# Patient Record
Sex: FEMALE | HISPANIC OR LATINO | Employment: UNEMPLOYED | URBAN - METROPOLITAN AREA
[De-identification: names, ages, dates, MRNs, and addresses within clinical notes are randomized per-mention and may not be internally consistent; named-entity substitution may affect disease eponyms.]

---

## 2023-11-23 ENCOUNTER — APPOINTMENT (OUTPATIENT)
Dept: GENERAL RADIOLOGY | Facility: CLINIC | Age: 33
End: 2023-11-23
Attending: EMERGENCY MEDICINE

## 2023-11-23 ENCOUNTER — APPOINTMENT (OUTPATIENT)
Dept: ULTRASOUND IMAGING | Facility: CLINIC | Age: 33
End: 2023-11-23
Attending: EMERGENCY MEDICINE

## 2023-11-23 ENCOUNTER — HOSPITAL ENCOUNTER (EMERGENCY)
Facility: CLINIC | Age: 33
Discharge: HOME | End: 2023-11-23
Attending: EMERGENCY MEDICINE | Admitting: EMERGENCY MEDICINE

## 2023-11-23 VITALS
OXYGEN SATURATION: 100 % | SYSTOLIC BLOOD PRESSURE: 121 MMHG | HEART RATE: 58 BPM | DIASTOLIC BLOOD PRESSURE: 81 MMHG | RESPIRATION RATE: 18 BRPM | TEMPERATURE: 97.9 F

## 2023-11-23 DIAGNOSIS — M79.7 FIBROMYALGIA: ICD-10-CM

## 2023-11-23 DIAGNOSIS — R52 BODY ACHES: ICD-10-CM

## 2023-11-23 LAB
ALBUMIN UR-MCNC: 20 MG/DL
ANION GAP SERPL CALCULATED.3IONS-SCNC: 9 MMOL/L (ref 7–15)
APPEARANCE UR: CLEAR
BASOPHILS # BLD AUTO: 0.1 10E3/UL (ref 0–0.2)
BASOPHILS NFR BLD AUTO: 1 %
BILIRUB UR QL STRIP: NEGATIVE
BUN SERPL-MCNC: 11.9 MG/DL (ref 6–20)
CALCIUM SERPL-MCNC: 9.1 MG/DL (ref 8.6–10)
CHLORIDE SERPL-SCNC: 101 MMOL/L (ref 98–107)
COLOR UR AUTO: YELLOW
CREAT SERPL-MCNC: 0.52 MG/DL (ref 0.51–0.95)
DEPRECATED HCO3 PLAS-SCNC: 27 MMOL/L (ref 22–29)
EGFRCR SERPLBLD CKD-EPI 2021: >90 ML/MIN/1.73M2
EOSINOPHIL # BLD AUTO: 0.1 10E3/UL (ref 0–0.7)
EOSINOPHIL NFR BLD AUTO: 2 %
ERYTHROCYTE [DISTWIDTH] IN BLOOD BY AUTOMATED COUNT: 14.1 % (ref 10–15)
GLUCOSE SERPL-MCNC: 89 MG/DL (ref 70–99)
GLUCOSE UR STRIP-MCNC: NEGATIVE MG/DL
HCG UR QL: NEGATIVE
HCT VFR BLD AUTO: 40.4 % (ref 35–47)
HGB BLD-MCNC: 13 G/DL (ref 11.7–15.7)
HGB UR QL STRIP: NEGATIVE
HOLD SPECIMEN: NORMAL
HOLD SPECIMEN: NORMAL
IMM GRANULOCYTES # BLD: 0 10E3/UL
IMM GRANULOCYTES NFR BLD: 1 %
KETONES UR STRIP-MCNC: 10 MG/DL
LEUKOCYTE ESTERASE UR QL STRIP: NEGATIVE
LYMPHOCYTES # BLD AUTO: 2.5 10E3/UL (ref 0.8–5.3)
LYMPHOCYTES NFR BLD AUTO: 33 %
MCH RBC QN AUTO: 28 PG (ref 26.5–33)
MCHC RBC AUTO-ENTMCNC: 32.2 G/DL (ref 31.5–36.5)
MCV RBC AUTO: 87 FL (ref 78–100)
MONOCYTES # BLD AUTO: 0.4 10E3/UL (ref 0–1.3)
MONOCYTES NFR BLD AUTO: 5 %
MUCOUS THREADS #/AREA URNS LPF: PRESENT /LPF
NEUTROPHILS # BLD AUTO: 4.5 10E3/UL (ref 1.6–8.3)
NEUTROPHILS NFR BLD AUTO: 58 %
NITRATE UR QL: NEGATIVE
NRBC # BLD AUTO: 0 10E3/UL
NRBC BLD AUTO-RTO: 0 /100
PH UR STRIP: 6.5 [PH] (ref 5–7)
PLATELET # BLD AUTO: 400 10E3/UL (ref 150–450)
POTASSIUM SERPL-SCNC: 4.1 MMOL/L (ref 3.4–5.3)
RBC # BLD AUTO: 4.64 10E6/UL (ref 3.8–5.2)
RBC URINE: 10 /HPF
SODIUM SERPL-SCNC: 137 MMOL/L (ref 135–145)
SP GR UR STRIP: 1.03 (ref 1–1.03)
SQUAMOUS EPITHELIAL: 1 /HPF
UROBILINOGEN UR STRIP-MCNC: NORMAL MG/DL
WBC # BLD AUTO: 7.6 10E3/UL (ref 4–11)
WBC URINE: 1 /HPF

## 2023-11-23 PROCEDURE — 93971 EXTREMITY STUDY: CPT | Mod: LT

## 2023-11-23 PROCEDURE — 81025 URINE PREGNANCY TEST: CPT | Performed by: EMERGENCY MEDICINE

## 2023-11-23 PROCEDURE — 250N000013 HC RX MED GY IP 250 OP 250 PS 637: Performed by: EMERGENCY MEDICINE

## 2023-11-23 PROCEDURE — 250N000011 HC RX IP 250 OP 636: Performed by: EMERGENCY MEDICINE

## 2023-11-23 PROCEDURE — 36415 COLL VENOUS BLD VENIPUNCTURE: CPT | Performed by: EMERGENCY MEDICINE

## 2023-11-23 PROCEDURE — 81001 URINALYSIS AUTO W/SCOPE: CPT | Performed by: EMERGENCY MEDICINE

## 2023-11-23 PROCEDURE — 85025 COMPLETE CBC W/AUTO DIFF WBC: CPT | Performed by: EMERGENCY MEDICINE

## 2023-11-23 PROCEDURE — 99285 EMERGENCY DEPT VISIT HI MDM: CPT | Mod: 25

## 2023-11-23 PROCEDURE — 71046 X-RAY EXAM CHEST 2 VIEWS: CPT

## 2023-11-23 PROCEDURE — 82310 ASSAY OF CALCIUM: CPT | Performed by: EMERGENCY MEDICINE

## 2023-11-23 RX ORDER — CYCLOBENZAPRINE HCL 10 MG
10 TABLET ORAL 3 TIMES DAILY PRN
Qty: 20 TABLET | Refills: 0 | Status: SHIPPED | OUTPATIENT
Start: 2023-11-23 | End: 2023-11-29

## 2023-11-23 RX ORDER — ACETAMINOPHEN 325 MG/1
975 TABLET ORAL ONCE
Status: COMPLETED | OUTPATIENT
Start: 2023-11-23 | End: 2023-11-23

## 2023-11-23 RX ORDER — ONDANSETRON 4 MG/1
4 TABLET, ORALLY DISINTEGRATING ORAL EVERY 8 HOURS PRN
Qty: 10 TABLET | Refills: 0 | Status: SHIPPED | OUTPATIENT
Start: 2023-11-23

## 2023-11-23 RX ORDER — ONDANSETRON 4 MG/1
4 TABLET, ORALLY DISINTEGRATING ORAL ONCE
Status: COMPLETED | OUTPATIENT
Start: 2023-11-23 | End: 2023-11-23

## 2023-11-23 RX ORDER — CYCLOBENZAPRINE HCL 10 MG
10 TABLET ORAL ONCE
Status: COMPLETED | OUTPATIENT
Start: 2023-11-23 | End: 2023-11-23

## 2023-11-23 RX ORDER — ACETAMINOPHEN 500 MG
1000 TABLET ORAL EVERY 8 HOURS PRN
Qty: 60 TABLET | Refills: 0 | Status: SHIPPED | OUTPATIENT
Start: 2023-11-23 | End: 2023-11-30

## 2023-11-23 RX ORDER — IBUPROFEN 600 MG/1
600 TABLET, FILM COATED ORAL ONCE
Status: COMPLETED | OUTPATIENT
Start: 2023-11-23 | End: 2023-11-23

## 2023-11-23 RX ORDER — IBUPROFEN 600 MG/1
600 TABLET, FILM COATED ORAL EVERY 6 HOURS PRN
Qty: 30 TABLET | Refills: 0 | Status: SHIPPED | OUTPATIENT
Start: 2023-11-23

## 2023-11-23 RX ADMIN — CYCLOBENZAPRINE 10 MG: 10 TABLET, FILM COATED ORAL at 16:53

## 2023-11-23 RX ADMIN — ACETAMINOPHEN 975 MG: 325 TABLET, FILM COATED ORAL at 12:32

## 2023-11-23 RX ADMIN — ONDANSETRON 4 MG: 4 TABLET, ORALLY DISINTEGRATING ORAL at 12:33

## 2023-11-23 RX ADMIN — IBUPROFEN 600 MG: 600 TABLET ORAL at 16:53

## 2023-11-23 ASSESSMENT — ACTIVITIES OF DAILY LIVING (ADL)
ADLS_ACUITY_SCORE: 35

## 2023-11-23 NOTE — ED PROVIDER NOTES
History     Chief Complaint:  Multiple Complaints    HPI   Louisa Pino is a 33 year old female presenting with a fibromyalgia flare-up, with back pain, left arm pain and swelling, loss of strength, numbness in her feet and hands, and feelings of being unable to keep her head up. Reports her left arm heaviness and swelling began today. She states that she used to take medications while in [place], but came here two months ago and has not been taking those medications. She normally takes duloxetine as well as another medication to control her fibromyalgia. She took duloxetine for her pain yesterday but states it made her nauseous. Denies fall or trauma. No chest pain or shortness of breath.     Independent Historian:   None - Patient Only    Review of External Notes:   I reviewed the patient's records from [place].     Medications:    Duloxetine    Past Medical History:    Fibromyalgia     Physical Exam   Patient Vitals for the past 24 hrs:   BP Temp Temp src Pulse Resp SpO2   11/23/23 1620 -- -- -- -- -- 100 %   11/23/23 1617 121/81 -- -- 58 -- --   11/23/23 1345 112/60 -- -- 51 -- 100 %   11/23/23 1127 121/83 97.9  F (36.6  C) Temporal 72 18 100 %        Physical Exam  General: Resting on the bed.  Head: No obvious trauma to head.  Ears, Nose, Throat:  External ears normal.  Nose normal.   Eyes:  Conjunctivae clear.  Pupils are equal, round, and reactive.   Neck: Normal range of motion.  Neck supple.   CV: Regular rate and rhythm.  No murmurs.      Respiratory: Effort normal and breath sounds normal.  No wheezing or crackles.   Gastrointestinal: Soft.  No distension. There is no tenderness.   Musculoskeletal: Tenderness throughout the left upper extremity along the trapezius as well.  No appreciable edema.  2+ radial pulses.  Neuro: Alert. Moving all extremities appropriately.  Normal speech.   5/5.  Sensation intact to light touch.  Lower extremity strength 5 out of 5.  Skin: Skin is warm and  dry.  No rash noted.       Emergency Department Course     Imaging:  US Upper Extremity Venous Duplex Left   Final Result   IMPRESSION:    1.  No deep venous thrombosis in the left upper extremity.      XR Chest 2 Views   Final Result   IMPRESSION: Negative chest.        Laboratory:  Labs Ordered and Resulted from Time of ED Arrival to Time of ED Departure   ROUTINE UA WITH MICROSCOPIC REFLEX TO CULTURE - Abnormal       Result Value    Color Urine Yellow      Appearance Urine Clear      Glucose Urine Negative      Bilirubin Urine Negative      Ketones Urine 10 (*)     Specific Gravity Urine 1.030      Blood Urine Negative      pH Urine 6.5      Protein Albumin Urine 20 (*)     Urobilinogen Urine Normal      Nitrite Urine Negative      Leukocyte Esterase Urine Negative      Mucus Urine Present (*)     RBC Urine 10 (*)     WBC Urine 1      Squamous Epithelials Urine 1     BASIC METABOLIC PANEL - Normal    Sodium 137      Potassium 4.1      Chloride 101      Carbon Dioxide (CO2) 27      Anion Gap 9      Urea Nitrogen 11.9      Creatinine 0.52      GFR Estimate >90      Calcium 9.1      Glucose 89     HCG QUALITATIVE URINE - Normal    hCG Urine Qualitative Negative     CBC WITH PLATELETS AND DIFFERENTIAL    WBC Count 7.6      RBC Count 4.64      Hemoglobin 13.0      Hematocrit 40.4      MCV 87      MCH 28.0      MCHC 32.2      RDW 14.1      Platelet Count 400      % Neutrophils 58      % Lymphocytes 33      % Monocytes 5      % Eosinophils 2      % Basophils 1      % Immature Granulocytes 1      NRBCs per 100 WBC 0      Absolute Neutrophils 4.5      Absolute Lymphocytes 2.5      Absolute Monocytes 0.4      Absolute Eosinophils 0.1      Absolute Basophils 0.1      Absolute Immature Granulocytes 0.0      Absolute NRBCs 0.0       Emergency Department Course & Assessments:       Interventions:  Medications   acetaminophen (TYLENOL) tablet 975 mg (975 mg Oral $Given 11/23/23 1232)   ondansetron (ZOFRAN ODT) ODT tab 4 mg (4  mg Oral $Given 23 1233)   cyclobenzaprine (FLEXERIL) tablet 10 mg (10 mg Oral $Given 23 1653)   ibuprofen (ADVIL/MOTRIN) tablet 600 mg (600 mg Oral $Given 23 1653)        Assessments:  1210 I entered the patient's room and obtained history.  1624 I rechecked the patient and explained findings.  1743 I rechecked the patient. I believe that they are safe for discharge at this time.    Independent Interpretation (X-rays, CTs, rhythm strip):  No appreciable pneumonia, pneumothorax or effusion.  No swelling or mass noted.    Consultations/Discussion of Management or Tests:  None        Social Determinants of Health affecting care:   Healthcare Access/Compliance patient recently immigrated to the [place], limited access to healthcare.    Disposition:  The patient was discharged to home.     Impression & Plan    CMS Diagnoses: None    Medical Decision Makin-year-old female presents to the ER with bodyaches and concern for fibromyalgia flare.  Vital signs are reassuring.  Broad differentials pursued include not limited to electrolyte, metabolic, renal dysfunction, anemia, pregnancy, urinary tract infection, DVT, stroke, dissection, pneumonia, pneumothorax, effusion, mass, etc.  Overall patient is well-appearing nontoxic.  CBC without leukocytosis or anemia.  BMP without acute electrolyte, metabolic or renal dysfunction.  UA is unrevealing no signs of infectious etiology.  Pregnancy test is negative.  Ultrasound is negative for acute DVT.  Chest x-ray shows no evidence of obvious mass, pneumonia, pneumothorax, effusion.  Patient has no chest pain or shortness of breath.  Rather she describes some left arm discomfort associated with her fibromyalgia plan.  She reports that this is a flare of her fibromyalgia pain.  I was able to relay to her the records from [place] and prescribed Flexeril, Tylenol, ibuprofen.  Her pain was much better after these medications.  I will send a referral for a PCP  for ongoing management of her fibromyalgia.  These medications are similar to what she was on in [place] as well.  Patient feeling much improved.  She denies any other symptoms.  She wishes to go home.  Return precautions provided.      Diagnosis:    ICD-10-CM    1. Body aches  R52 Primary Care Referral      2. Fibromyalgia  M79.7 Primary Care Referral           Discharge Medications:  New Prescriptions    ACETAMINOPHEN (TYLENOL) 500 MG TABLET    Take 2 tablets (1,000 mg) by mouth every 8 hours as needed for pain or fever    CYCLOBENZAPRINE (FLEXERIL) 10 MG TABLET    Take 1 tablet (10 mg) by mouth 3 times daily as needed for muscle spasms    IBUPROFEN (ADVIL/MOTRIN) 600 MG TABLET    Take 1 tablet (600 mg) by mouth every 6 hours as needed for moderate pain    ONDANSETRON (ZOFRAN ODT) 4 MG ODT TAB    Take 1 tablet (4 mg) by mouth every 8 hours as needed for nausea      Scribe Disclosure:  Viji APODACA Hired, am serving as a scribe at 12:17 PM on 11/23/2023 to document services personally performed by Mila Mustafa MD based on my observations and the provider's statements to me.   11/23/2023   Mila Mustafa MD Bennett, Jennifer L, MD  11/23/23 [id number]

## 2023-11-23 NOTE — DISCHARGE INSTRUCTIONS
May use flexeril for muscle pains.  Avoid driving, operating heavy machinery or other dangerous activity.  Please use tylenol and ibuprofen for pain control.  Heat and warm packs to the areas.  Return if worsening pain, numbness or tingling, chest pain or shortness of breath, weakness or other acute changes.      Follow up with PCP in 2-3 days.

## 2023-11-23 NOTE — ED TRIAGE NOTES
Pt presents to the ED with complaint of back pain, loss of movement to left arm, tingling to the left arm, nausea, and her head feeling like it is not on the right side of her body. These symptoms have been intermittent for years, but this current episode started 2 weeks ago. Pt states she has fibromyalgia and every time she has a flare up she has all of these symptoms- including loss of movement to her extremities. Pt has swelling to left chest near left shoulder. Denies trauma. Pain 10/10 in upper left back.     Triage Assessment (Adult)       Row Name 11/23/23 1131          Triage Assessment    Airway WDL WDL        Respiratory WDL    Respiratory WDL WDL        Skin Circulation/Temperature WDL    Skin Circulation/Temperature WDL WDL        Cardiac WDL    Cardiac WDL WDL        Peripheral/Neurovascular WDL    Peripheral Neurovascular WDL WDL        Cognitive/Neuro/Behavioral WDL    Cognitive/Neuro/Behavioral WDL WDL

## 2023-11-30 ENCOUNTER — APPOINTMENT (OUTPATIENT)
Dept: ADMINISTRATIVE | Facility: CLINIC | Age: 33
End: 2023-11-30

## 2023-12-08 ENCOUNTER — APPOINTMENT (OUTPATIENT)
Dept: ADMINISTRATIVE | Facility: CLINIC | Age: 33
End: 2023-12-08